# Patient Record
Sex: MALE | Race: WHITE | NOT HISPANIC OR LATINO | Employment: OTHER | URBAN - METROPOLITAN AREA
[De-identification: names, ages, dates, MRNs, and addresses within clinical notes are randomized per-mention and may not be internally consistent; named-entity substitution may affect disease eponyms.]

---

## 2017-03-13 PROBLEM — Z86.19 HISTORY OF HEPATITIS C: Status: ACTIVE | Noted: 2017-03-13

## 2017-03-13 PROBLEM — M54.50 CHRONIC MIDLINE LOW BACK PAIN WITHOUT SCIATICA: Status: ACTIVE | Noted: 2017-03-13

## 2017-03-13 PROBLEM — D69.6 THROMBOCYTOPENIA: Status: ACTIVE | Noted: 2017-03-13

## 2017-03-13 PROBLEM — G89.29 CHRONIC MIDLINE LOW BACK PAIN WITHOUT SCIATICA: Status: ACTIVE | Noted: 2017-03-13

## 2017-03-13 PROBLEM — K80.20 CALCULUS OF GALLBLADDER WITHOUT CHOLECYSTITIS WITHOUT OBSTRUCTION: Status: ACTIVE | Noted: 2017-03-13

## 2017-03-13 PROBLEM — R18.8 ASCITES: Status: ACTIVE | Noted: 2017-03-13

## 2017-03-13 PROBLEM — N52.9 ERECTILE DYSFUNCTION: Status: ACTIVE | Noted: 2017-03-13

## 2017-03-13 PROBLEM — J44.9 COPD, MODERATE: Status: ACTIVE | Noted: 2017-03-13

## 2017-03-15 PROBLEM — M54.42 CHRONIC MIDLINE LOW BACK PAIN WITH LEFT-SIDED SCIATICA: Status: ACTIVE | Noted: 2017-03-13

## 2017-10-06 PROBLEM — K74.60 CIRRHOSIS: Status: ACTIVE | Noted: 2017-10-06

## 2017-12-11 PROBLEM — F33.1 MODERATE RECURRENT MAJOR DEPRESSION: Status: ACTIVE | Noted: 2017-12-11

## 2017-12-11 PROBLEM — K74.69 OTHER CIRRHOSIS OF LIVER: Status: ACTIVE | Noted: 2017-10-06

## 2017-12-11 PROBLEM — F14.20 COCAINE DEPENDENCE: Status: ACTIVE | Noted: 2017-12-11

## 2017-12-11 PROBLEM — R79.89 ABNORMAL TSH: Status: ACTIVE | Noted: 2017-12-11

## 2017-12-11 PROBLEM — S92.353A CLOSED FRACTURE OF BASE OF FIFTH METATARSAL BONE: Status: ACTIVE | Noted: 2017-12-11

## 2017-12-11 PROBLEM — F10.20 ALCOHOL DEPENDENCE: Status: ACTIVE | Noted: 2017-12-11

## 2018-01-04 PROBLEM — K92.2 GI BLEED: Status: ACTIVE | Noted: 2018-01-04

## 2018-01-04 PROBLEM — R06.02 SOB (SHORTNESS OF BREATH): Status: ACTIVE | Noted: 2018-01-04

## 2018-01-05 PROBLEM — K92.2 GI BLEED: Status: ACTIVE | Noted: 2018-01-05

## 2018-04-17 PROBLEM — Z86.19 HISTORY OF HEPATITIS C VIRUS INFECTION: Status: ACTIVE | Noted: 2018-04-17

## 2018-04-17 PROBLEM — Z86.79 HISTORY OF HYPERTENSION: Status: ACTIVE | Noted: 2018-04-17

## 2018-04-17 PROBLEM — R77.2 ELEVATED AFP: Status: ACTIVE | Noted: 2018-04-17

## 2018-05-21 PROBLEM — R93.2 ABNORMAL ULTRASOUND OF LIVER: Status: ACTIVE | Noted: 2018-05-21

## 2018-06-13 PROBLEM — R10.11 RIGHT UPPER QUADRANT ABDOMINAL PAIN: Status: ACTIVE | Noted: 2018-06-13

## 2018-06-13 PROBLEM — I81 PORTAL VEIN THROMBOSIS: Status: ACTIVE | Noted: 2018-06-13

## 2018-06-13 PROBLEM — R07.9 CHEST PAIN: Status: ACTIVE | Noted: 2018-06-13

## 2018-06-14 PROBLEM — R10.11 RIGHT UPPER QUADRANT ABDOMINAL PAIN: Status: ACTIVE | Noted: 2018-06-14

## 2018-06-14 PROBLEM — I81 PORTAL VEIN THROMBOSIS: Status: ACTIVE | Noted: 2018-06-14

## 2018-06-17 ENCOUNTER — DOCUMENTATION ONLY (OUTPATIENT)
Dept: TRANSPLANT | Facility: CLINIC | Age: 58
End: 2018-06-17

## 2018-06-17 NOTE — LETTER
June 17, 2018    Pro Jacobs  [address]  [address]  [facility] [address]      Dear Pro Jacobs:    Your doctor has referred you to the [facility]. You will be contacted by our office and an initial appointment will then be scheduled for you.    We look forward to seeing you soon. If you have any further questions, please contact us at [phone].       Sincerely,        [facility]   [address] [place] [id number]  [phone]

## 2018-06-17 NOTE — NURSING
Pt records reviewed.   Pt will be referred to Hepatology.    Initial referral received  from the workque.   Referring Provider/diagnosis    AMOS GREER Provider:   Diagnosis: Other cirrhosis of liver     Elevated AFP, last CT without mass. Meld 12. See note by January sauceda.    Referral letter sent to provider and patient.

## 2018-06-21 ENCOUNTER — HOSPITAL ENCOUNTER (OUTPATIENT)
Dept: RADIOLOGY | Facility: HOSPITAL | Age: 58
Discharge: HOME | End: 2018-06-21
Attending: NURSE PRACTITIONER
Payer: MEDICARE

## 2018-06-21 PROCEDURE — 25500020 PHARM REV CODE 255: Performed by: NURSE PRACTITIONER

## 2018-06-21 PROCEDURE — 74183 MRI ABD W/O CNTR FLWD CNTR: CPT | Mod: TC

## 2018-06-21 PROCEDURE — A9585 GADOBUTROL INJECTION: HCPCS | Performed by: NURSE PRACTITIONER

## 2018-06-21 PROCEDURE — 74183 MRI ABD W/O CNTR FLWD CNTR: CPT | Mod: 26,,, | Performed by: RADIOLOGY

## 2018-06-21 RX ORDER — GADOBUTROL 604.72 MG/ML
10 INJECTION INTRAVENOUS
Status: COMPLETED | OUTPATIENT
Start: 2018-06-21 | End: 2018-06-21

## 2018-06-21 RX ADMIN — GADOBUTROL 10 ML: 604.72 INJECTION INTRAVENOUS at 07:06

## 2018-06-22 ENCOUNTER — TELEPHONE (OUTPATIENT)
Dept: TRANSPLANT | Facility: CLINIC | Age: 58
End: 2018-06-22

## 2018-06-27 ENCOUNTER — OFFICE VISIT (OUTPATIENT)
Dept: HEPATOLOGY | Facility: CLINIC | Age: 58
End: 2018-06-27
Payer: MEDICARE

## 2018-06-27 ENCOUNTER — LAB VISIT (OUTPATIENT)
Dept: LAB | Facility: HOSPITAL | Age: 58
End: 2018-06-27
Payer: MEDICARE

## 2018-06-27 ENCOUNTER — TELEPHONE (OUTPATIENT)
Dept: HEPATOLOGY | Facility: CLINIC | Age: 58
End: 2018-06-27

## 2018-06-27 VITALS
BODY MASS INDEX: 44.1 KG/M2 | WEIGHT: 315 LBS | HEART RATE: 73 BPM | HEIGHT: 71 IN | RESPIRATION RATE: 20 BRPM | SYSTOLIC BLOOD PRESSURE: 117 MMHG | DIASTOLIC BLOOD PRESSURE: 90 MMHG | OXYGEN SATURATION: 91 %

## 2018-06-27 DIAGNOSIS — Z86.19 HISTORY OF HEPATITIS C VIRUS INFECTION: ICD-10-CM

## 2018-06-27 DIAGNOSIS — C22.0 HCC (HEPATOCELLULAR CARCINOMA): ICD-10-CM

## 2018-06-27 DIAGNOSIS — I85.10 SECONDARY ESOPHAGEAL VARICES WITHOUT BLEEDING: ICD-10-CM

## 2018-06-27 DIAGNOSIS — F19.90 DRUG USE: ICD-10-CM

## 2018-06-27 DIAGNOSIS — I81 PORTAL VEIN THROMBOSIS: ICD-10-CM

## 2018-06-27 DIAGNOSIS — K76.6 PORTAL HYPERTENSION: ICD-10-CM

## 2018-06-27 DIAGNOSIS — Z78.9 ALCOHOL USE: ICD-10-CM

## 2018-06-27 DIAGNOSIS — R77.2 ELEVATED AFP: ICD-10-CM

## 2018-06-27 DIAGNOSIS — C22.0 HCC (HEPATOCELLULAR CARCINOMA): Primary | ICD-10-CM

## 2018-06-27 LAB
AFP SERPL-MCNC: 1653 NG/ML
ALBUMIN SERPL BCP-MCNC: 2.9 G/DL
ALP SERPL-CCNC: 166 U/L
ALT SERPL W/O P-5'-P-CCNC: 74 U/L
ANION GAP SERPL CALC-SCNC: 7 MMOL/L
AST SERPL-CCNC: 171 U/L
BILIRUB DIRECT SERPL-MCNC: 2.4 MG/DL
BILIRUB SERPL-MCNC: 4.2 MG/DL
BUN SERPL-MCNC: 15 MG/DL
CALCIUM SERPL-MCNC: 8.7 MG/DL
CHLORIDE SERPL-SCNC: 102 MMOL/L
CO2 SERPL-SCNC: 29 MMOL/L
CREAT SERPL-MCNC: 0.8 MG/DL
EST. GFR  (AFRICAN AMERICAN): >60 ML/MIN/1.73 M^2
EST. GFR  (NON AFRICAN AMERICAN): >60 ML/MIN/1.73 M^2
GLUCOSE SERPL-MCNC: 98 MG/DL
INR PPP: 1.1
POTASSIUM SERPL-SCNC: 3.9 MMOL/L
PROT SERPL-MCNC: 6.7 G/DL
PROTHROMBIN TIME: 11.8 SEC
SODIUM SERPL-SCNC: 138 MMOL/L

## 2018-06-27 PROCEDURE — 3008F BODY MASS INDEX DOCD: CPT | Mod: CPTII,S$GLB,, | Performed by: NURSE PRACTITIONER

## 2018-06-27 PROCEDURE — 82105 ALPHA-FETOPROTEIN SERUM: CPT

## 2018-06-27 PROCEDURE — 85610 PROTHROMBIN TIME: CPT

## 2018-06-27 PROCEDURE — 36415 COLL VENOUS BLD VENIPUNCTURE: CPT

## 2018-06-27 PROCEDURE — 3080F DIAST BP >= 90 MM HG: CPT | Mod: CPTII,S$GLB,, | Performed by: NURSE PRACTITIONER

## 2018-06-27 PROCEDURE — 82248 BILIRUBIN DIRECT: CPT

## 2018-06-27 PROCEDURE — 3074F SYST BP LT 130 MM HG: CPT | Mod: CPTII,S$GLB,, | Performed by: NURSE PRACTITIONER

## 2018-06-27 PROCEDURE — 80053 COMPREHEN METABOLIC PANEL: CPT

## 2018-06-27 PROCEDURE — 99999 PR PBB SHADOW E&M-EST. PATIENT-LVL IV: CPT | Mod: PBBFAC,,, | Performed by: NURSE PRACTITIONER

## 2018-06-27 PROCEDURE — 99204 OFFICE O/P NEW MOD 45 MIN: CPT | Mod: S$GLB,,, | Performed by: NURSE PRACTITIONER

## 2018-06-27 NOTE — PROGRESS NOTES
"[facility] VISIT NEW PT NOTE    REFERRING PROVIDER: Dr. Mcginnis    CHIEF COMPLAINT: HCC    HPI: This is a 58 y.o. White male with cirrhosis due to h/o hepatitis C s/p treatment with SVR being referred for HCC. He has elevated AFP of 989 on most recent lab. AFP was mildly elevated at 37 in 12/2017 and then increased to 318 in 4/2018. U/s has not shown any masses. TPCT's have not shown a definite liver mass. TPCT 4/27/18 showed heterogeneous area of diminished enhancement in the medial aspect of the right lobe of the liver superiorly. TPCT done again 6/13/18 w/o mass seen. U/s w doppler 6/13 showed main, left, and right portal veins appear thrombosed.  Additionally, no flow is identified in the SMV suggesting possible thrombosis.  Reversed flow is also noted in the splenic vein.       He then had MRI 6/21/18 that showed there does appear be some irregular enhancement throughout a majority of the right hepatic lobe.  In the right hepatic lobe posteriorly near the dome, there is a large area of decreased attenuation on the delayed images with peripheral capsular enhancement that measures approximately 10.0 x 7.0 cm concerning for infiltrative HCC. Also had thrombosis of main portal vein seen on MRI.     He was going to have biopsy of mass but given MRI findings and elevated AFP, biopsy was cancelled since findings strongly suggestive of HCC. He is now scheduled to see oncology next week at [facility] to discuss possible Nexavar therapy.      He has followed with January Johnston NP at [facility] for his liver disease. He does continue use cocaine and alcohol per her notes so his hep C was not treated here. He reports last drug use "few months ago." Last alcohol use a yr ago.     Denies jaundice, dark urine, abdominal distention, hematemesis, melena, slowed mentation.        Review of patient's allergies indicates:  No Known Allergies    Current Outpatient Prescriptions on File Prior to Visit   Medication Sig " Dispense Refill    albuterol (PROVENTIL HFA) 90 mcg/actuation inhaler Inhale 2 puffs into the lungs every 6 (six) hours as needed for Wheezing. Ok to substitute pro-air or ventolin 18 g 5    pantoprazole (PROTONIX) 40 MG tablet Take 1 tablet (40 mg total) by mouth once daily. 90 tablet 3    propranolol (INDERAL LA) 60 MG 24 hr capsule Take 1 capsule by mouth once daily.      albuterol-ipratropium 2.5mg-0.5mg/3mL (DUO-NEB) 0.5 mg-3 mg(2.5 mg base)/3 mL nebulizer solution Take 3 mLs by nebulization every 6 (six) hours as needed for Wheezing. Rescue 1 Box 1    doxepin (SINEQUAN) 25 MG capsule Take 1 mg by mouth nightly as needed.       furosemide (LASIX) 40 MG tablet Take 40 mg by mouth 2 (two) times daily.      hydrOXYzine (ATARAX) 50 MG tablet Take 1 tablet by mouth every 6 (six) hours as needed for Anxiety.      morphine (MS CONTIN) 15 MG 12 hr tablet Take 1 tablet (15 mg total) by mouth 2 (two) times daily. 14 tablet 0    oxyCODONE (OXY-IR) 5 mg Cap Take 2 capsules (10 mg total) by mouth every 6 (six) hours as needed for Pain. 56 capsule 0    rOPINIRole (REQUIP) 1 MG tablet Take 1 tablet (1 mg total) by mouth 3 (three) times daily. 90 tablet 3    tamsulosin (FLOMAX) 0.4 mg Cp24 Take 1 capsule (0.4 mg total) by mouth every evening. 90 capsule 1    trazodone (DESYREL) 100 MG tablet Take 1 tablet (100 mg total) by mouth every evening. 30 tablet 3    vardenafil (LEVITRA) 20 MG tablet Take 1 tablet (20 mg total) by mouth daily as needed. 15 tablet 3    zolpidem (AMBIEN) 10 mg Tab Take 1 tablet by mouth nightly as needed.       No current facility-administered medications on file prior to visit.        PMHX:  has a past medical history of Arthritis; Asthma; Chronic bronchitis; Cirrhosis; COPD (chronic obstructive pulmonary disease); Depression; History of psychiatric hospitalization; psychiatric care; Liver disease; Portal hypertensive gastropathy; Psychiatric problem; Therapy; and Varices, esophageal  "(01/24/2018).    PSHX:  has a past surgical history that includes Colonoscopy and Upper gastrointestinal endoscopy (01/24/2018).    FAMILY HISTORY: Negative for liver disease, reviewed in [facility]    SOCIAL HISTORY:   History   Smoking Status    Current Every Day Smoker    Packs/day: 0.50    Years: 40.00    Types: Cigarettes    Start date: 9/3/1973   Smokeless Tobacco    Current User       History   Alcohol Use No     Comment: "last drink was a couple of months ago"       History   Drug Use    Types: "Crack" cocaine     Comment: Hx ivda- cocaine-last used 4/1/18         ROS:   GENERAL: Denies fever, chills, weight loss/gain, (+) fatigue  HEENT: Denies headaches, dizziness, vision/hearing changes  CARDIOVASCULAR: Denies chest pain, palpitations, (+) edema  RESPIRATORY: (+) dyspnea, cough  GI: (+) abdominal pain, no rectal bleeding, nausea, vomiting. No change in bowel pattern or color  : Denies dysuria, hematuria   SKIN: Denies rash, itching   NEURO: Denies confusion, memory loss, or mood changes  PSYCH: Denies depression or anxiety  HEME/LYMPH: (+) easy bruising, bleeding        PHYSICAL EXAM:   Friendly morbidly obese white male, in no acute distress; alert and oriented to person, place and time  VITALS: BP (!) 117/90 (BP Location: Left arm, Patient Position: Sitting, BP Method: X-Large (Automatic))   Pulse 73   Resp 20   Ht 5' 11" (1.803 m)   Wt (!) 172.8 kg (380 lb 15.3 oz)   SpO2 (!) 91%   BMI 53.13 kg/m²   HENT: Normocephalic, without obvious abnormality. Oral mucosa pink and moist. Dentition poor.  EYES: Sclerae mildly icteric. No conjunctival pallor.   NECK: Supple. No masses or cervical adenopathy.  CARDIOVASCULAR: Regular rate and rhythm. No murmurs.  RESPIRATORY: Normal respiratory effort. BBS CTA. No wheezes or crackles.  GI: Obese, soft, non-tender, non-distended. No masses palpable. No definite ascites.  EXTREMITIES:  No clubbing, cyanosis, (+) trace BLE edema.  SKIN: Warm and dry. No " jaundice. No rashes noted to exposed skin. (+) telangectasias noted. No palmar erythema.  NEURO:  Normal gate. No asterixis.  PSYCH:  Memory intact. Thought and speech pattern appropriate. Behavior normal. No depression or anxiety noted.      RECENT LABS:    Labs:  Lab Results   Component Value Date    WBC 3.72 (L) 06/21/2018    HGB 13.1 (L) 06/21/2018    HCT 39.0 (L) 06/21/2018    PLT 73 (L) 06/21/2018    CHOL 160 12/06/2017    TRIG 45 12/06/2017    HDL 57 12/06/2017     06/21/2018    K 4.1 06/21/2018    CREATININE 0.9 06/21/2018    ALT 72 (H) 06/21/2018     (H) 06/21/2018    ALKPHOS 161 (H) 06/21/2018    BILITOT 4.0 (H) 06/21/2018    ALBUMIN 3.2 (L) 06/21/2018    INR 1.1 06/15/2018     (H) 06/13/2018       DIAGNOSTIC STUDIES:  EGD- 1/24/18  Impression:           - Medium sized varices                        - Compensated cirrhosis due to Hepatitis C (treated)                        - Report of melena last month with stable H/H                        - Erosions today with portal hypertensive                         gastropathy are the likely etiology    ABD. U/S- 6/13/18  FINDINGS:  Liver: The liver is enlarged measuring 21.4 cm. Diffusely heterogeneous hepatic parenchyma is noted with no definite focal hepatic mass identified although evaluation is limited secondary to the diffuse heterogeneity.  Nodular contour of the liver is also noted compatible with the patient's history of cirrhosis.    Gallbladder: Stones and sludge are noted in the gallbladder.  Mild diffuse gallbladder wall thickening is noted likely secondary to the patient's underlying hepatic dysfunction.  No pericholecystic fluid or sonographic Robles's sign identified.    Biliary system: The common duct is not dilated, measuring 5 mm.  No intrahepatic ductal dilatation.    Spleen: The spleen is enlarged measuring 19.8 cm.  Splenic collateral vessels are noted.    Pancreas: The visualized portions of pancreas appear  normal.    Right kidney: Normal in size with no hydronephrosis, measuring 10.8 cm.  A 2.8 cm cyst is noted in the right kidney.    Left kidney: Normal in size with no hydronephrosis, measuring 10.6 cm.    Aorta: No aneurysm.    Inferior vena cava: Difficult to visualize but shows antegrade flow.    Miscellaneous: Mild ascites is noted.  A right pleural effusion is present.    Main hepatic artery: Patent.    Main portal vein: The main portal vein appears thrombosed.    Left portal vein:  The left portal vein is mostly thrombosed.    Right portal vein:  The right portal vein is thrombosed.    SMV:  No flow is identified within the SMV suggesting possible thrombosis.    Left, middle, and right hepatic veins: Patent.    Umbilical vein: Not visualized.    Celiac artery: Not visualized.    Splenic vein: Reversed flow is noted in the splenic vein.   Impression       1. The main, left, and right portal veins appear thrombosed.  Additionally, no flow is identified in the SMV suggesting possible thrombosis.  Reversed flow is also noted in the splenic vein.  2. Sequela of portal hypertension including mild ascites and splenomegaly.  3. Diffusely heterogeneous nodule liver compatible with the patient's history of cirrhosis with no definite focal hepatic lesion identified although evaluation is limited secondary to the diffuse heterogeneity.  4. Gallbladder Stones and sludge.  5. Mild diffuse gallbladder wall thickening likely secondary to the patient's underlying hepatic dysfunction.  6. Right renal cyst.  7. Right pleural effusion.  This report was flagged in Epic as abnormal.       CT SCAN- 6/13/18  FINDINGS:  Multiphase imaging liver:    The liver is small and nodular consistent with underlying cirrhosis with no worrisome hypo or hypervascular masses.  Gallbladder is present mildly distended containing multiple gallstones there is no biliary dilatation or CT findings to suggest acute calculus cholecystitis.  The portal vein  is patent and normal in caliber.  There is stable moderate splenomegaly.  Minimal peritoneal fluid is seen within the right upper quadrant.    CT scan abdomen:    Scans through lung bases show a small right pleural effusion.    Examination remaining upper abdomen demonstrate the pancreas, adrenals, and both kidneys to be stable and normal in size there is no evidence of hydronephrosis.  There are extensive splenic varices and CT findings consistent with spontaneous splenorenal shunting.    CT scan pelvis    Delayed scans through the entirety of abdomen and pelvis show both ureters to be symmetric and normal in size.  The large and small bowel are normal in caliber.  There are mild diverticular changes seen within the colon, no CT findings of acute diverticulitis are present.  Bone windows appear normal for age.  A small amount of free pelvic fluid is noted.   Impression       1. small right pleural effusion, changes of cirrhosis and portal hypertension, no CT findings of significant acute abnormality in abdomen or pelvis       MRI- 6/21/18  FINDINGS:  The study is significantly limited secondary to patient body habitus in the patient's inability to stay centered within the body coil.There is a large right-sided pleural effusion.  The base of the heart appears normal.  The aorta is of normal caliber and tapers appropriately.    There is ascites visualized in the upper abdomen.  The liver demonstrates a nodular contour compatible with cirrhosis.  The spleen is significantly enlarged compatible with portal hypertension.  Multiple collateral vessels are seen in the left upper quadrant and there are esophageal and gastric varices visualized.  The liver demonstrates an overall heterogenous appearance.  There is elevated diffusion seen throughout a majority of the right hepatic lobe.  The postcontrast images are significantly limited by patient motion artifact.  There does appear be some irregular enhancement throughout a  majority of the right hepatic lobe.  In the right hepatic lobe posteriorly near the dome, there is a large area of decreased attenuation on the delayed images with peripheral capsular enhancement that measures approximately 10.0 x 7.0 cm concerning for infiltrative HCC.  Additionally on the delayed images, there is branching decreased attenuation throughout a majority of the right hepatic lobe which is most likely related to portal vein thrombosis.  Additionally, there is concern for thrombosis of the portal vein as best seen on coronal series 16 image 44    Cholelithiasis.  No intrahepatic or extrahepatic biliary ductal dilatation is identified.  The pancreas is not well visualized on this examination.  The adrenal glands and kidneys have a normal appearance.  Bilateral renal cysts are noted.   Impression       Imaging findings compatible with cirrhosis with portal hypertension as evidenced by nodular contour of the liver, splenomegaly, ascites and multiple collateral vessels in the upper abdomen.  The study is significantly limited secondary to patient body habitus and significant motion throughout the examination.  There does appear to be thrombosis of the main portal vein with what appears to be probable branch thrombosis of the portal vein throughout the majority of the right hepatic lobe.  However, there is a more focal area of decreased enhancement on delayed images with capsular enhancement in the superior right hepatic lobe measuring 10 x 7 cm concerning for hepatocellular carcinoma in light of the patient's history of elevated AFP.  Please note that this could be better evaluated given patient body habitus and patient motion with a CT scan of the abdomen performed as a liver protocol examination.         ASSESSMENT:  58 y.o. White male with:  1.  HCC with likely portal vein invasion based on MRI/abd u/s w doppler  -- very large infiltrating mass, 10 cm, on MRI. Not seen on TPCT  -- AFP rapidly rising, now  "989 on 6/13/18  -- seeing oncology next week for possible Nexavar therapy  -- will have scans reviewed in IR conference to determine if any locoregional therapy (possible Y90) is feasible but with his bilirubin being elevated, this may not be possible  2. Cirrhosis, mildly decompensated  -- MELD = MELD-Na score: 13 at 6/27/2018  2:26 PM  MELD score: 13 at 6/27/2018  2:26 PM  Calculated from:  Serum Creatinine: 0.8 mg/dL (Rounded to 1) at 6/27/2018  2:03 PM  Serum Sodium: 138 mmol/L (Rounded to 137) at 6/27/2018  2:03 PM  Total Bilirubin: 4.2 mg/dL at 6/27/2018  2:03 PM  INR(ratio): 1.1 at 6/27/2018  2:26 PM  Age: 58 years  -- EGD 1/2018 - medium varices  3. History of hepatitis C, s/p successful treatment externally with SVR  4. Alcohol abuse, drug abuse  -- last smoked crack cocaine "few months ago." Last alcohol use a yr ago.   5. Portal hypertension  -- EGD 1/2018 - medium varices  -- Ascites - mild on recent u/s, on lasix 40 mg daily but pt takes QOD  -- Splenomegaly, thrombocytopenia       EDUCATION:   Discussed with pt and wife that he has very large HCC with likely portal vein invasion. Not a transplant candidate and may not be candidate for any locoregional therapy. See oncology to determine if he's a candidate for Nexavar therapy. Likely poor prognosis unfortunately.       PLAN:  1. Labs today  2. Submit for IR conference review next week  3. See oncology as scheduled for possible Nexavar therapy  4. Recommended to abstain from drugs and alcohol  5. Can continue to follow with January Johnston NP for his cirrhosis management  6. No f/u with us needed if no locoregional therapy recommended. Will call pt next week with IR conference recommendations      Thank you for allowing me to participate in the care of HERMILA Rao    "

## 2018-06-27 NOTE — PROGRESS NOTES
I have personally performed a face to face diagnostic evaluation on this patient. I have reviewed and agree with today's findings and the care plan outlined by Maggie Alexander NP      My findings are as follows:  Patient presents with cirrhosis and a large mass (10 cm) with invasion into the PV on MRI. Ct in March did not show anything. AFP has increased and is now 989. Tbil is ~4. Recommend IR review of films and repeat labs today with fractionation of Tbil. Currently , is Tbil is moslty direct then will not be able to offer any locoregional therapy. I do recommend oncology appt.      he will return to Maggie Alexander NP  for follow-up.

## 2018-06-27 NOTE — LETTER
June 27, 2018      Tyron Mcginnis MD  [address]  [place] [address]           [facility] - Hepatology  [address]  [facility] [address]  Phone: [phone]  Fax: [phone]          Patient: Pro Jacobs   MR Number: [id number]   YOB: 1960   Date of Visit: 6/27/2018       Dear Dr. Tyron Mcginnis:    Thank you for referring Pro Jacobs to me for evaluation. Attached you will find relevant portions of my assessment and plan of care.    If you have questions, please do not hesitate to call me. I look forward to following Pro Jacobs along with you.    Sincerely,    Maggie Alexander, NP    Enclosure  CC:  No Recipients    If you would like to receive this communication electronically, please contact [email or web address] or [phone] to request more information on [employer] access.    For providers and/or their staff who would like to refer a patient to [facility], please contact us through our one-stop-shop provider referral line, [facility] , at [phone].    If you feel you have received this communication in error or would no longer like to receive these types of communications, please e-mail [email or web address]

## 2018-06-27 NOTE — TELEPHONE ENCOUNTER
Patient: Pro Jacobs       MRN: [id number]      : 1960     Age: 58 y.o.  [address]  [address]  [place] [address]    Provider: [facility] - Maggie Alexander NP/Dr. Sheth    Urgency of review: non-urgent    Patient Transplant Status: Not a candidate    Reason for presentation: Other evaluate for any possible locoregional therapy    Clinical Summary:  Pt with cirrhosis and a large mass (10 cm) with invasion into the PV on MRI. CT's did not show anything. AFP has increased and is now 989. Tbil is ~4. Evaluate for possible locoregional therapy. Pt has appt with oncology for possible Nexavar therapy.    Imaging to be reviewed: MRI 18, TPCT 18    HCC Treatment History: n/a    ABO: A POS    Platelets:   Lab Results   Component Value Date/Time    PLT 73 (L) 2018 09:00 AM     Creatinine:   Lab Results   Component Value Date/Time    CREATININE 0.8 2018 02:03 PM     Bilirubin:   Lab Results   Component Value Date/Time    BILITOT 4.2 (H) 2018 02:03 PM     AFP Last 3 each if available:   Lab Results   Component Value Date/Time    AFP 1653 (H) 2018 02:26 PM     (H) 2018 03:14 PM     (H) 2018 08:01 AM       MELD: MELD-Na score: 13 at 2018  2:26 PM  MELD score: 13 at 2018  2:26 PM  Calculated from:  Serum Creatinine: 0.8 mg/dL (Rounded to 1) at 2018  2:03 PM  Serum Sodium: 138 mmol/L (Rounded to 137) at 2018  2:03 PM  Total Bilirubin: 4.2 mg/dL at 2018  2:03 PM  INR(ratio): 1.1 at 2018  2:26 PM  Age: 58 years    Plan:     Follow-up Provider:

## 2018-06-28 NOTE — TELEPHONE ENCOUNTER
Patient: Pro Jacobs       MRN: [id number]      : 1960     Age: 58 y.o.  [address]  [address]  [place] [address]     Provider: [facility] - Maggie Alexander NP/Dr. Sheth     Urgency of review: non-urgent     Patient Transplant Status: Not a candidate     Reason for presentation: Other evaluate for any possible locoregional therapy     Clinical Summary:  Pt with cirrhosis and a large mass (10 cm) with invasion into the PV on MRI. CT's did not show anything. AFP has increased and is now 989. Tbil is ~4. Evaluate for possible locoregional therapy. Pt has appt with oncology for possible Nexavar therapy.     Imaging to be reviewed: MRI 18, TPCT 18     HCC Treatment History: n/a     ABO: A POS     Platelets:         Lab Results   Component Value Date/Time     PLT 73 (L) 2018 09:00 AM      Creatinine:         Lab Results   Component Value Date/Time     CREATININE 0.8 2018 02:03 PM      Bilirubin:         Lab Results   Component Value Date/Time     BILITOT 4.2 (H) 2018 02:03 PM      AFP Last 3 each if available:         Lab Results   Component Value Date/Time     AFP 1653 (H) 2018 02:26 PM      (H) 2018 03:14 PM      (H) 2018 08:01 AM         MELD: MELD-Na score: 13 at 2018  2:26 PM  MELD score: 13 at 2018  2:26 PM  Calculated from:  Serum Creatinine: 0.8 mg/dL (Rounded to 1) at 2018  2:03 PM  Serum Sodium: 138 mmol/L (Rounded to 137) at 2018  2:03 PM  Total Bilirubin: 4.2 mg/dL at 2018  2:03 PM  INR(ratio): 1.1 at 2018  2:26 PM  Age: 58 years     Plan: Irregular enhancement seen throughout the right lobe of the liver with an enlarged mass on delayed images measuring up to 10 cm. There is suggestion of main, R, and L PVT with extensive collateral formation. Can not definitely say HCC, however images are extremely limited. Given labs - unlikely to have any good locoregional option.     Highly suspicious for infiltrative mass  involving R, L and extending into main portal.     Follow-up Provider: Maggie Alexander NP

## 2018-07-02 ENCOUNTER — TELEPHONE (OUTPATIENT)
Dept: PHARMACY | Facility: CLINIC | Age: 58
End: 2018-07-02

## 2018-07-02 ENCOUNTER — TELEPHONE (OUTPATIENT)
Dept: HEPATOLOGY | Facility: CLINIC | Age: 58
End: 2018-07-02

## 2018-07-02 NOTE — TELEPHONE ENCOUNTER
MA spoke to pt. Told him that he did not have any labs scheduled for us. Pt gave verbal understanding. EMS

## 2018-07-03 ENCOUNTER — CONFERENCE (OUTPATIENT)
Dept: TRANSPLANT | Facility: CLINIC | Age: 58
End: 2018-07-03

## 2018-07-05 ENCOUNTER — TELEPHONE (OUTPATIENT)
Dept: HEPATOLOGY | Facility: CLINIC | Age: 58
End: 2018-07-05

## 2018-07-05 NOTE — TELEPHONE ENCOUNTER
Spoke with pt regarding IR conference recommendations. Unfortunately, nothing to offer pt from locoregional standpoint. See oncology for Nexaver therapy which he has already. Can continue to follow up with January Johnston Np at [facility] for his cirrhosis. Pt verbalized understanding.

## 2018-07-06 NOTE — TELEPHONE ENCOUNTER
Nexavar is approved by the patient's insurance with a high co pay. We will be assisting the patient in applying to the  for assistance. Sending a staff message to Dr Alexandre regarding approval and application prescription for signature.

## 2018-07-11 ENCOUNTER — TELEPHONE (OUTPATIENT)
Dept: PHARMACY | Facility: CLINIC | Age: 58
End: 2018-07-11

## 2018-07-11 PROBLEM — K74.69 OTHER CIRRHOSIS OF LIVER: Chronic | Status: ACTIVE | Noted: 2017-10-06

## 2018-07-11 PROBLEM — Z86.19 HISTORY OF HEPATITIS C VIRUS INFECTION: Chronic | Status: ACTIVE | Noted: 2018-04-17

## 2018-07-11 PROBLEM — F33.1 MODERATE RECURRENT MAJOR DEPRESSION: Chronic | Status: ACTIVE | Noted: 2017-12-11

## 2018-07-11 PROBLEM — I81 PORTAL VEIN THROMBOSIS: Chronic | Status: ACTIVE | Noted: 2018-06-14

## 2018-07-11 PROBLEM — J44.9 COPD, MODERATE: Chronic | Status: ACTIVE | Noted: 2017-03-13

## 2018-07-11 PROBLEM — C22.0 HEPATOCELLULAR CARCINOMA: Chronic | Status: ACTIVE | Noted: 2018-06-27

## 2018-07-11 PROBLEM — Z86.79 HISTORY OF HYPERTENSION: Chronic | Status: ACTIVE | Noted: 2018-04-17

## 2018-07-11 PROBLEM — I50.9 ACUTE CONGESTIVE HEART FAILURE: Status: ACTIVE | Noted: 2018-07-11

## 2018-07-11 PROBLEM — R06.02 SOB (SHORTNESS OF BREATH): Chronic | Status: ACTIVE | Noted: 2018-01-04

## 2018-07-11 PROBLEM — R77.2 ELEVATED AFP: Chronic | Status: ACTIVE | Noted: 2018-04-17

## 2018-07-13 NOTE — TELEPHONE ENCOUNTER
FOR DOCUMENTATION ONLY:  Financial Assistance for Nexavar is approved from 7-13-18 to 12-31-18  [employer]  [phone]  Sending Dr Barrow a staff message notifying him of the approval.